# Patient Record
Sex: MALE | Race: WHITE | NOT HISPANIC OR LATINO | Employment: OTHER | URBAN - METROPOLITAN AREA
[De-identification: names, ages, dates, MRNs, and addresses within clinical notes are randomized per-mention and may not be internally consistent; named-entity substitution may affect disease eponyms.]

---

## 2017-11-03 PROBLEM — M54.40 LUMBAGO WITH SCIATICA: Status: ACTIVE | Noted: 2017-11-03

## 2018-06-07 PROBLEM — M51.26 LUMBAR DISC DISPLACEMENT WITHOUT MYELOPATHY: Status: ACTIVE | Noted: 2018-06-07

## 2018-06-28 PROBLEM — I10 HTN (HYPERTENSION): Status: ACTIVE | Noted: 2018-06-28

## 2018-06-28 PROBLEM — E78.5 HLD (HYPERLIPIDEMIA): Status: ACTIVE | Noted: 2018-06-28

## 2018-06-28 PROBLEM — Z01.818 PREOP TESTING: Status: ACTIVE | Noted: 2018-06-28

## 2018-06-28 PROBLEM — Z98.1 S/P LUMBAR FUSION: Status: ACTIVE | Noted: 2018-06-28

## 2018-06-29 PROBLEM — K56.7 ILEUS: Status: ACTIVE | Noted: 2018-06-29

## 2018-07-24 PROBLEM — T81.40XA POST OP INFECTION: Status: ACTIVE | Noted: 2018-07-24

## 2018-07-27 PROBLEM — B18.2 CHRONIC HEPATITIS C WITHOUT HEPATIC COMA: Status: ACTIVE | Noted: 2018-07-27

## 2018-08-01 PROBLEM — R63.8 INCREASED NUTRITIONAL NEEDS: Status: ACTIVE | Noted: 2018-08-01

## 2018-12-18 ENCOUNTER — TELEPHONE (OUTPATIENT)
Dept: PHARMACY | Facility: CLINIC | Age: 58
End: 2018-12-18

## 2018-12-18 NOTE — TELEPHONE ENCOUNTER
Patient is uninsured for his Mavyret prescription. We will be assisting the patient in applying to the . Sending a staff message to Julio FALL regarding assistance and faxing application prescription for her review and signature.

## 2019-01-03 NOTE — TELEPHONE ENCOUNTER
FOR DOCUMENTATION ONLY:  Financial Assistance for Mavyret is approved from 12-31-18 to 6-29-19 (12 weeks)  Source [employer] Patient Assistance    Sending a staff message to Jennifer Scheuermann regarding approval

## 2019-01-14 ENCOUNTER — TELEPHONE (OUTPATIENT)
Dept: PHARMACY | Facility: CLINIC | Age: 59
End: 2019-01-14

## 2019-01-14 NOTE — TELEPHONE ENCOUNTER
[facility].  Patient reports that medication is expected to be be received on Thursday 1/17 and started on 1/18.  Patient stats that he did receive patient education from [facility] however the following was reinforced:    Initial Mavyret consult completed on 1/14. Mavyret to arrive at patient's home on 1/17. Patient will start Mavyret on 1/18.     Mavyret 100/40mg- Take three tablets by mouth once daily WITH FOOD x 12 weeks  Counseling was reviewed:   1. Patient MUST take Mavret at the SAME time every day.   2. Potential Side effects include: nausea, headaches, diarrhea and fatigue.   Headache: Patient may treat with OTC remedies. If Tylenol is used, dose should not exceed 2000mg per day.    4. Medication list reviewed. No DDIs or allergies noted. Patient MUST contact myself or provider prior to starting any new OTC, herbal, or prescription drugs to avoid potential DDIs.    DDI: Medication list reviewed and potential DDIs addressed.    Discussed the importance of staying well hydrated while on therapy. Compliance stressed - patient to take missed doses as soon as remembered, but NOT to take 2 doses in one day. Patient will report questions or concerns to myself or practitioner. Patient verbalizes understanding. Patient plans to start Mavyret on 1/18.  Consultation included: indication; goals of treatment; administration; storage and handling; side effects; how to handle side effects; the importance of compliance; how to handle missed doses; the importance of laboratory monitoring; the importance of keeping all follow up appointments.  Patient understands all future pharmaceutical care is to be provided by [facility].     Jose Eduardo Aparicio, SUSAN.Ph., [facility]  Clinical Pharmacist, HIV/HCV  [facility]  Phone: [phone]

## 2019-02-13 PROBLEM — K74.60 HEPATIC CIRRHOSIS DUE TO CHRONIC HEPATITIS C INFECTION: Status: ACTIVE | Noted: 2018-07-27
